# Patient Record
Sex: FEMALE | URBAN - METROPOLITAN AREA
[De-identification: names, ages, dates, MRNs, and addresses within clinical notes are randomized per-mention and may not be internally consistent; named-entity substitution may affect disease eponyms.]

---

## 2022-04-26 ENCOUNTER — WEB ENCOUNTER (OUTPATIENT)
Dept: URBAN - METROPOLITAN AREA SURGERY CENTER 16 | Facility: SURGERY CENTER | Age: 52
End: 2022-04-26

## 2022-04-29 ENCOUNTER — OFFICE VISIT (OUTPATIENT)
Dept: URBAN - METROPOLITAN AREA SURGERY CENTER 16 | Facility: SURGERY CENTER | Age: 52
End: 2022-04-29
Payer: COMMERCIAL

## 2022-04-29 DIAGNOSIS — Z12.11 COLON CANCER SCREENING: ICD-10-CM

## 2022-04-29 PROCEDURE — G8907 PT DOC NO EVENTS ON DISCHARG: HCPCS | Performed by: INTERNAL MEDICINE

## 2022-04-29 PROCEDURE — 45378 DIAGNOSTIC COLONOSCOPY: CPT | Performed by: INTERNAL MEDICINE

## 2023-06-22 ENCOUNTER — LAB OUTSIDE AN ENCOUNTER (OUTPATIENT)
Dept: URBAN - METROPOLITAN AREA CLINIC 92 | Facility: CLINIC | Age: 53
End: 2023-06-22

## 2023-06-22 ENCOUNTER — OFFICE VISIT (OUTPATIENT)
Dept: URBAN - METROPOLITAN AREA CLINIC 92 | Facility: CLINIC | Age: 53
End: 2023-06-22
Payer: COMMERCIAL

## 2023-06-22 VITALS
DIASTOLIC BLOOD PRESSURE: 77 MMHG | TEMPERATURE: 96.6 F | SYSTOLIC BLOOD PRESSURE: 122 MMHG | HEIGHT: 63 IN | BODY MASS INDEX: 17.22 KG/M2 | WEIGHT: 97.2 LBS | HEART RATE: 86 BPM

## 2023-06-22 DIAGNOSIS — R11.2 NAUSEA AND VOMITING, UNSPECIFIED VOMITING TYPE: ICD-10-CM

## 2023-06-22 DIAGNOSIS — R63.4 WEIGHT LOSS: ICD-10-CM

## 2023-06-22 DIAGNOSIS — K21.9 CHRONIC GERD: ICD-10-CM

## 2023-06-22 PROBLEM — 235595009: Status: ACTIVE | Noted: 2023-06-22

## 2023-06-22 PROCEDURE — 99214 OFFICE O/P EST MOD 30 MIN: CPT | Performed by: INTERNAL MEDICINE

## 2023-06-22 RX ORDER — ONDANSETRON 4 MG/1
TAKE ONE TABLET BY MOUTH ONE TIME DAILY AS NEEDED FOR NAUSEA AND VOMITING TABLET, FILM COATED ORAL
Qty: 9 UNSPECIFIED | Refills: 0 | Status: ACTIVE | COMMUNITY

## 2023-06-22 RX ORDER — DOXYCYCLINE HYCLATE 100 MG/1
CAPSULE ORAL
Qty: 60 CAPSULE | Status: ACTIVE | COMMUNITY

## 2023-06-22 RX ORDER — FLUOCINOLONE ACETONIDE 0.11 MG/118.28ML
OIL TOPICAL
Qty: 118 UNSPECIFIED | Status: ACTIVE | COMMUNITY

## 2023-06-22 RX ORDER — CLINDAMYCIN PHOSPHATE 10 MG/G
GEL TOPICAL
Qty: 30 GRAM | Status: ACTIVE | COMMUNITY

## 2023-06-22 RX ORDER — ESOMEPRAZOLE MAGNESIUM 40 MG/1
1 CAPSULE CAPSULE, DELAYED RELEASE ORAL ONCE A DAY
Status: ACTIVE | COMMUNITY

## 2023-06-22 RX ORDER — ADAPALENE 3 MG/G
GEL TOPICAL
Qty: 45 GRAM | Status: ACTIVE | COMMUNITY

## 2023-06-22 RX ORDER — PILOCARPINE HYDROCHLORIDE 12.5 MG/ML
INSTILL 1 DROP INTO BOTH EYES IN THE MORNING SOLUTION/ DROPS OPHTHALMIC
Qty: 2.5 MILLILITER | Refills: 0 | Status: ACTIVE | COMMUNITY

## 2023-06-22 NOTE — HPI-TODAY'S VISIT:
This is a 54 yo female referred by Dr. Santiago for GERD.  March 2023 she reports an exacerbation of GERD.  Seh describes nausea and vomiting.  She denies early satiety and dysphagia.  She admits to a 10 pound weight loss since March.  She also reports LLQ pain, rated a 4, also started in March.  Bowel movements occur every other day with the sensation of complete emptying. Nexium 40 mg has improved but not resolved her issues. Coffee and greasy meals exacerbate her symptoms.   She last vomited one week ago.  A colonoscopy 2022 was normal.   A recent US was unremarkable.

## 2023-06-22 NOTE — PHYSICAL EXAM RECTAL:
normal tone, no external hemorrhoids, no masses palpable, no red blood, Tenderness on ABRAHAM, Internal hemorrhoids present

## 2023-07-21 ENCOUNTER — LAB OUTSIDE AN ENCOUNTER (OUTPATIENT)
Dept: URBAN - METROPOLITAN AREA CLINIC 17 | Facility: CLINIC | Age: 53
End: 2023-07-21

## 2023-07-21 ENCOUNTER — CLAIMS CREATED FROM THE CLAIM WINDOW (OUTPATIENT)
Dept: URBAN - METROPOLITAN AREA SURGERY CENTER 16 | Facility: SURGERY CENTER | Age: 53
End: 2023-07-21

## 2023-07-21 ENCOUNTER — TELEPHONE ENCOUNTER (OUTPATIENT)
Dept: URBAN - METROPOLITAN AREA CLINIC 17 | Facility: CLINIC | Age: 53
End: 2023-07-21

## 2023-07-21 ENCOUNTER — CLAIMS CREATED FROM THE CLAIM WINDOW (OUTPATIENT)
Dept: URBAN - METROPOLITAN AREA SURGERY CENTER 16 | Facility: SURGERY CENTER | Age: 53
End: 2023-07-21
Payer: COMMERCIAL

## 2023-07-21 ENCOUNTER — CLAIMS CREATED FROM THE CLAIM WINDOW (OUTPATIENT)
Dept: URBAN - METROPOLITAN AREA CLINIC 4 | Facility: CLINIC | Age: 53
End: 2023-07-21
Payer: COMMERCIAL

## 2023-07-21 DIAGNOSIS — K29.60 OTHER GASTRITIS WITHOUT BLEEDING: ICD-10-CM

## 2023-07-21 DIAGNOSIS — R63.4 WEIGHT LOSS: ICD-10-CM

## 2023-07-21 DIAGNOSIS — K31.A0 GASTRIC INTESTINAL METAPLASIA, UNSPECIFIED: ICD-10-CM

## 2023-07-21 DIAGNOSIS — R11.2 NAUSEA AND VOMITING, UNSPECIFIED VOMITING TYPE: ICD-10-CM

## 2023-07-21 PROCEDURE — 88305 TISSUE EXAM BY PATHOLOGIST: CPT | Performed by: PATHOLOGY

## 2023-07-21 PROCEDURE — 43239 EGD BIOPSY SINGLE/MULTIPLE: CPT | Performed by: INTERNAL MEDICINE

## 2023-07-21 PROCEDURE — 88342 IMHCHEM/IMCYTCHM 1ST ANTB: CPT | Performed by: PATHOLOGY

## 2023-07-21 PROCEDURE — G8907 PT DOC NO EVENTS ON DISCHARG: HCPCS | Performed by: INTERNAL MEDICINE

## 2023-07-21 RX ORDER — ONDANSETRON 4 MG/1
TAKE ONE TABLET BY MOUTH ONE TIME DAILY AS NEEDED FOR NAUSEA AND VOMITING TABLET, FILM COATED ORAL
Qty: 9 UNSPECIFIED | Refills: 0 | Status: ACTIVE | COMMUNITY

## 2023-07-21 RX ORDER — ESOMEPRAZOLE MAGNESIUM 40 MG/1
1 CAPSULE CAPSULE, DELAYED RELEASE ORAL ONCE A DAY
Status: ACTIVE | COMMUNITY

## 2023-07-21 RX ORDER — FLUOCINOLONE ACETONIDE 0.11 MG/118.28ML
OIL TOPICAL
Qty: 118 UNSPECIFIED | Status: ACTIVE | COMMUNITY

## 2023-07-21 RX ORDER — PILOCARPINE HYDROCHLORIDE 12.5 MG/ML
INSTILL 1 DROP INTO BOTH EYES IN THE MORNING SOLUTION/ DROPS OPHTHALMIC
Qty: 2.5 MILLILITER | Refills: 0 | Status: ACTIVE | COMMUNITY

## 2023-07-21 RX ORDER — DOXYCYCLINE HYCLATE 100 MG/1
CAPSULE ORAL
Qty: 60 CAPSULE | Status: ACTIVE | COMMUNITY

## 2023-07-21 RX ORDER — ADAPALENE 3 MG/G
GEL TOPICAL
Qty: 45 GRAM | Status: ACTIVE | COMMUNITY

## 2023-07-21 RX ORDER — CLINDAMYCIN PHOSPHATE 10 MG/G
GEL TOPICAL
Qty: 30 GRAM | Status: ACTIVE | COMMUNITY

## 2023-09-11 ENCOUNTER — TELEPHONE ENCOUNTER (OUTPATIENT)
Dept: URBAN - METROPOLITAN AREA CLINIC 92 | Facility: CLINIC | Age: 53
End: 2023-09-11

## 2023-09-11 RX ORDER — BISMUTH SUBCITRATE POTASSIUM, METRONIDAZOLE, TETRACYCLINE HYDROCHLORIDE 140; 125; 125 MG/1; MG/1; MG/1
3 CAPSULES AFTER MEALS AND AT BEDTIME CAPSULE ORAL
Qty: 120 | OUTPATIENT
Start: 2023-09-12 | End: 2023-09-22

## 2023-09-12 ENCOUNTER — WEB ENCOUNTER (OUTPATIENT)
Dept: URBAN - METROPOLITAN AREA CLINIC 92 | Facility: CLINIC | Age: 53
End: 2023-09-12

## 2023-11-21 ENCOUNTER — OFFICE VISIT (OUTPATIENT)
Dept: URBAN - METROPOLITAN AREA CLINIC 17 | Facility: CLINIC | Age: 53
End: 2023-11-21

## 2023-11-30 ENCOUNTER — OFFICE VISIT (OUTPATIENT)
Dept: URBAN - METROPOLITAN AREA CLINIC 92 | Facility: CLINIC | Age: 53
End: 2023-11-30
Payer: COMMERCIAL

## 2023-11-30 VITALS
BODY MASS INDEX: 17.89 KG/M2 | SYSTOLIC BLOOD PRESSURE: 138 MMHG | HEART RATE: 81 BPM | HEIGHT: 63 IN | WEIGHT: 101 LBS | TEMPERATURE: 96.6 F | DIASTOLIC BLOOD PRESSURE: 78 MMHG

## 2023-11-30 DIAGNOSIS — A04.8 BACTERIAL INFECTION DUE TO H. PYLORI: ICD-10-CM

## 2023-11-30 DIAGNOSIS — F41.1 ANXIETY, GENERALIZED: ICD-10-CM

## 2023-11-30 DIAGNOSIS — K58.9 IRRITABLE BOWEL SYNDROME, UNSPECIFIED TYPE: ICD-10-CM

## 2023-11-30 DIAGNOSIS — A04.8 H. PYLORI INFECTION: ICD-10-CM

## 2023-11-30 DIAGNOSIS — K58.8 OTHER IRRITABLE BOWEL SYNDROME: ICD-10-CM

## 2023-11-30 PROBLEM — 21897009: Status: ACTIVE | Noted: 2023-11-30

## 2023-11-30 PROBLEM — 10743008: Status: ACTIVE | Noted: 2023-11-30

## 2023-11-30 PROCEDURE — 99214 OFFICE O/P EST MOD 30 MIN: CPT | Performed by: INTERNAL MEDICINE

## 2023-11-30 RX ORDER — FLUOCINOLONE ACETONIDE 0.11 MG/118.28ML
OIL TOPICAL
Qty: 118 UNSPECIFIED | Status: ACTIVE | COMMUNITY

## 2023-11-30 RX ORDER — ADAPALENE 3 MG/G
GEL TOPICAL
Qty: 45 GRAM | Status: ACTIVE | COMMUNITY

## 2023-11-30 RX ORDER — ONDANSETRON 4 MG/1
TAKE ONE TABLET BY MOUTH ONE TIME DAILY AS NEEDED FOR NAUSEA AND VOMITING TABLET, FILM COATED ORAL
Qty: 9 UNSPECIFIED | Refills: 0 | Status: ACTIVE | COMMUNITY

## 2023-11-30 RX ORDER — CLINDAMYCIN PHOSPHATE 10 MG/G
GEL TOPICAL
Qty: 30 GRAM | Status: ACTIVE | COMMUNITY

## 2023-11-30 RX ORDER — ESOMEPRAZOLE MAGNESIUM 40 MG/1
1 CAPSULE CAPSULE, DELAYED RELEASE ORAL ONCE A DAY
Status: ACTIVE | COMMUNITY

## 2023-11-30 RX ORDER — AMITRIPTYLINE HYDROCHLORIDE 25 MG/1
1 TABLET AT BEDTIME TABLET, FILM COATED ORAL ONCE A DAY
Qty: 30 | Refills: 2 | OUTPATIENT
Start: 2023-11-30

## 2023-11-30 NOTE — HPI-TODAY'S VISIT:
(June 2023) This is a 54 yo female referred by Dr. Santiago for GERD.  March 2023 she reports an exacerbation of GERD.  Seh describes nausea and vomiting.  She denies early satiety and dysphagia.  She admits to a 10 pound weight loss since March.  She also reports LLQ pain, rated a 4, also started in March.  Bowel movements occur every other day with the sensation of complete emptying. Nexium 40 mg has improved but not resolved her issues. Coffee and greasy meals exacerbate her symptoms.   She last vomited one week ago.  A colonoscopy 2022 was normal.   A recent US was unremarkable.

## 2023-11-30 NOTE — HPI-TODAY'S VISIT:
Today: 11/30/2023. This is a 53-year-old female here for follow-up of abdominal pain bloating nausea and vomiting.  An upper endoscopy July 21, 2023 demonstrated polyps in the gastric fundus.  Biopsies were consistent with H. pylori.  She completed 2 weeks of treatment 6 weeks ago however she continues to take Nexium 40 mg once a day which she states helps with the bloating, and alternating diarrhea and constipation.  A gastric emptying study performed for bloating August 2023 was unremarkable. Since she was last seen in the office she was diagnosed with generalized anxiety disorder and depression and placed on Lexapro which she has been taking since October.  It is help with anxiety but is not had an effect on her gastrointestinal symptoms.  She notes that alternating diarrhea and constipation are exacerbated with stress.  She believes this stress is work-related.  She is doing clinical research for HIV.

## 2024-01-17 ENCOUNTER — DASHBOARD ENCOUNTERS (OUTPATIENT)
Age: 54
End: 2024-01-17

## 2024-01-18 ENCOUNTER — OFFICE VISIT (OUTPATIENT)
Dept: URBAN - METROPOLITAN AREA CLINIC 92 | Facility: CLINIC | Age: 54
End: 2024-01-18
Payer: COMMERCIAL

## 2024-01-18 VITALS
TEMPERATURE: 96.8 F | WEIGHT: 102.6 LBS | HEART RATE: 92 BPM | HEIGHT: 63 IN | BODY MASS INDEX: 18.18 KG/M2 | DIASTOLIC BLOOD PRESSURE: 73 MMHG | SYSTOLIC BLOOD PRESSURE: 117 MMHG

## 2024-01-18 DIAGNOSIS — K58.8 OTHER IRRITABLE BOWEL SYNDROME: ICD-10-CM

## 2024-01-18 DIAGNOSIS — A04.8 BACTERIAL INFECTION DUE TO H. PYLORI: ICD-10-CM

## 2024-01-18 PROCEDURE — 99213 OFFICE O/P EST LOW 20 MIN: CPT | Performed by: INTERNAL MEDICINE

## 2024-01-18 RX ORDER — ADAPALENE 3 MG/G
GEL TOPICAL
Qty: 45 GRAM | Status: ACTIVE | COMMUNITY

## 2024-01-18 RX ORDER — AMITRIPTYLINE HYDROCHLORIDE 25 MG/1
1 TABLET AT BEDTIME TABLET, FILM COATED ORAL ONCE A DAY
Qty: 30 | Refills: 2 | Status: ACTIVE | COMMUNITY
Start: 2023-11-30

## 2024-01-18 RX ORDER — AMITRIPTYLINE HYDROCHLORIDE 25 MG/1
1 TABLET AT BEDTIME TABLET, FILM COATED ORAL ONCE A DAY
Qty: 90 | Refills: 3 | OUTPATIENT

## 2024-01-18 RX ORDER — CLINDAMYCIN PHOSPHATE 10 MG/G
GEL TOPICAL
Qty: 30 GRAM | Status: ACTIVE | COMMUNITY

## 2024-01-18 RX ORDER — FLUOCINOLONE ACETONIDE 0.11 MG/118.28ML
OIL TOPICAL
Qty: 118 UNSPECIFIED | Status: ACTIVE | COMMUNITY

## 2024-01-18 RX ORDER — ONDANSETRON 4 MG/1
TAKE ONE TABLET BY MOUTH ONE TIME DAILY AS NEEDED FOR NAUSEA AND VOMITING TABLET, FILM COATED ORAL
Qty: 9 UNSPECIFIED | Refills: 0 | Status: ACTIVE | COMMUNITY

## 2024-01-18 RX ORDER — ESOMEPRAZOLE MAGNESIUM 40 MG/1
1 CAPSULE CAPSULE, DELAYED RELEASE ORAL ONCE A DAY
Status: ON HOLD | COMMUNITY

## 2024-01-18 NOTE — HPI-OTHER HISTORIES
(11/30/2023) This is a 53-year-old female here for follow-up of abdominal pain bloating nausea and vomiting. An upper endoscopy July 21, 2023 demonstrated polyps in the gastric fundus. Biopsies were consistent with H. pylori. She completed 2 weeks of treatment 6 weeks ago however she continues to take Nexium 40 mg once a day which she states helps with the bloating, and alternating diarrhea and constipation. A gastric emptying study performed for bloating August 2023 was unremarkable.  She was diagnosed with generalized anxiety disorder and depression and placed on Lexapro which she has been taking since October. It is help with anxiety but is not had an effect on her gastrointestinal symptoms. She notes that alternating diarrhea and constipation are exacerbated with stress. She believes this stress is work-related. She is doing clinical research for HIV.

## 2024-01-18 NOTE — HPI-TODAY'S VISIT:
(June 2023) This is a 52 yo female referred by Dr. Santiago for GERD.  March 2023 she reports an exacerbation of GERD.  Seh describes nausea and vomiting.  She denies early satiety and dysphagia.  She admits to a 10 pound weight loss since March.  She also reports LLQ pain, rated a 4, also started in March.  Bowel movements occur every other day with the sensation of complete emptying. Nexium 40 mg has improved but not resolved her issues. Coffee and greasy meals exacerbate her symptoms.   She last vomited one week ago.  A colonoscopy 2022 was normal.   A recent US was unremarkable.

## 2024-01-18 NOTE — HPI-TODAY'S VISIT:
This is a 54 yo female here for follow up.  She was placed on amitriptyline November 2023 for functionioal abdominal pain, nausea and vomiting.   This has significantly improved her symptoms.   She is no longer experiencing laternating diarrhea and constipation, nausea or vomting.   She completed therapy for H pylori 2023 and has been off PPI for over 6 weeks.

## 2024-01-19 LAB — H PYLORI BREATH TEST: NOT DETECTED

## 2024-01-30 ENCOUNTER — TELEPHONE ENCOUNTER (OUTPATIENT)
Dept: URBAN - METROPOLITAN AREA CLINIC 92 | Facility: CLINIC | Age: 54
End: 2024-01-30

## 2024-01-30 RX ORDER — AMITRIPTYLINE HYDROCHLORIDE 25 MG/1
1 TABLET AT BEDTIME TABLET, FILM COATED ORAL ONCE A DAY
Qty: 90 | Refills: 3

## 2025-02-27 ENCOUNTER — OFFICE VISIT (OUTPATIENT)
Dept: URBAN - METROPOLITAN AREA CLINIC 92 | Facility: CLINIC | Age: 55
End: 2025-02-27
Payer: COMMERCIAL

## 2025-02-27 VITALS
TEMPERATURE: 97 F | HEART RATE: 92 BPM | SYSTOLIC BLOOD PRESSURE: 122 MMHG | BODY MASS INDEX: 20.55 KG/M2 | DIASTOLIC BLOOD PRESSURE: 75 MMHG | WEIGHT: 116 LBS | HEIGHT: 63 IN

## 2025-02-27 DIAGNOSIS — K58.9 IRRITABLE BOWEL SYNDROME, UNSPECIFIED TYPE: ICD-10-CM

## 2025-02-27 DIAGNOSIS — F41.1 ANXIETY, GENERALIZED: ICD-10-CM

## 2025-02-27 PROCEDURE — 99213 OFFICE O/P EST LOW 20 MIN: CPT | Performed by: INTERNAL MEDICINE

## 2025-02-27 RX ORDER — ONDANSETRON 4 MG/1
TAKE ONE TABLET BY MOUTH ONE TIME DAILY AS NEEDED FOR NAUSEA AND VOMITING TABLET, FILM COATED ORAL
Qty: 9 UNSPECIFIED | Refills: 0 | Status: ACTIVE | COMMUNITY

## 2025-02-27 RX ORDER — AMITRIPTYLINE HYDROCHLORIDE 25 MG/1
1 TABLET AT BEDTIME TABLET, FILM COATED ORAL ONCE A DAY
Qty: 90 | Refills: 3 | Status: ACTIVE | COMMUNITY

## 2025-02-27 RX ORDER — AMITRIPTYLINE HYDROCHLORIDE 25 MG/1
1 TABLET AT BEDTIME TABLET, FILM COATED ORAL ONCE A DAY
Qty: 90 | Refills: 3 | OUTPATIENT

## 2025-02-27 RX ORDER — ESOMEPRAZOLE MAGNESIUM 40 MG/1
1 CAPSULE CAPSULE, DELAYED RELEASE ORAL ONCE A DAY
Status: ACTIVE | COMMUNITY

## 2025-02-27 NOTE — HPI-TODAY'S VISIT:
(January 2024) This is a 52 yo female here for follow up.  She was placed on amitriptyline November 2023 for functionioal abdominal pain, nausea and vomiting.   This has significantly improved her symptoms.   She is no longer experiencing laternating diarrhea and constipation, nausea or vomting.   She completed therapy for H pylori 2023 and has been off PPI for over 6 weeks.  Today:  February 27, 2025 This is a 53 yo F here for follow up of IBS,  The patient has done well on amitriptyline.  If she stops the medication for one week her symptoms recur.  She started seeing a counselor for her anxiety August 2024.   She tries to relieve stress with exercise.   Colonoscopy 2022 was unremarkable.